# Patient Record
Sex: FEMALE | Race: WHITE | HISPANIC OR LATINO | URBAN - METROPOLITAN AREA
[De-identification: names, ages, dates, MRNs, and addresses within clinical notes are randomized per-mention and may not be internally consistent; named-entity substitution may affect disease eponyms.]

---

## 2021-10-10 ENCOUNTER — EMERGENCY (EMERGENCY)
Facility: HOSPITAL | Age: 46
LOS: 0 days | Discharge: HOME | End: 2021-10-10
Attending: EMERGENCY MEDICINE | Admitting: EMERGENCY MEDICINE
Payer: MEDICAID

## 2021-10-10 VITALS
RESPIRATION RATE: 16 BRPM | WEIGHT: 100.09 LBS | OXYGEN SATURATION: 98 % | HEART RATE: 100 BPM | SYSTOLIC BLOOD PRESSURE: 162 MMHG | DIASTOLIC BLOOD PRESSURE: 68 MMHG | TEMPERATURE: 97 F

## 2021-10-10 VITALS
OXYGEN SATURATION: 98 % | HEART RATE: 84 BPM | DIASTOLIC BLOOD PRESSURE: 68 MMHG | RESPIRATION RATE: 18 BRPM | SYSTOLIC BLOOD PRESSURE: 121 MMHG

## 2021-10-10 DIAGNOSIS — T84.021A DISLOCATION OF INTERNAL LEFT HIP PROSTHESIS, INITIAL ENCOUNTER: ICD-10-CM
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
## 2021-10-10 DIAGNOSIS — M25.552 PAIN IN LEFT HIP: ICD-10-CM

## 2021-10-10 DIAGNOSIS — Z88.1 ALLERGY STATUS TO OTHER ANTIBIOTIC AGENTS STATUS: ICD-10-CM

## 2021-10-10 LAB
ALBUMIN SERPL ELPH-MCNC: 3.8 G/DL — SIGNIFICANT CHANGE UP (ref 3.5–5.2)
ALP SERPL-CCNC: 124 U/L — HIGH (ref 30–115)
ALT FLD-CCNC: 38 U/L — SIGNIFICANT CHANGE UP (ref 0–41)
ANION GAP SERPL CALC-SCNC: 17 MMOL/L — HIGH (ref 7–14)
AST SERPL-CCNC: 53 U/L — HIGH (ref 0–41)
BASOPHILS # BLD AUTO: 0.07 K/UL — SIGNIFICANT CHANGE UP (ref 0–0.2)
BASOPHILS NFR BLD AUTO: 1.3 % — HIGH (ref 0–1)
BILIRUB SERPL-MCNC: 1.4 MG/DL — HIGH (ref 0.2–1.2)
BUN SERPL-MCNC: 10 MG/DL — SIGNIFICANT CHANGE UP (ref 10–20)
CALCIUM SERPL-MCNC: 9.1 MG/DL — SIGNIFICANT CHANGE UP (ref 8.5–10.1)
CHLORIDE SERPL-SCNC: 96 MMOL/L — LOW (ref 98–110)
CO2 SERPL-SCNC: 19 MMOL/L — SIGNIFICANT CHANGE UP (ref 17–32)
CREAT SERPL-MCNC: 0.5 MG/DL — LOW (ref 0.7–1.5)
EOSINOPHIL # BLD AUTO: 0.12 K/UL — SIGNIFICANT CHANGE UP (ref 0–0.7)
EOSINOPHIL NFR BLD AUTO: 2.2 % — SIGNIFICANT CHANGE UP (ref 0–8)
GLUCOSE SERPL-MCNC: 150 MG/DL — HIGH (ref 70–99)
HCG SERPL QL: NEGATIVE — SIGNIFICANT CHANGE UP
HCT VFR BLD CALC: 25.5 % — LOW (ref 37–47)
HGB BLD-MCNC: 8.8 G/DL — LOW (ref 12–16)
IMM GRANULOCYTES NFR BLD AUTO: 0.4 % — HIGH (ref 0.1–0.3)
LYMPHOCYTES # BLD AUTO: 1.25 K/UL — SIGNIFICANT CHANGE UP (ref 1.2–3.4)
LYMPHOCYTES # BLD AUTO: 22.6 % — SIGNIFICANT CHANGE UP (ref 20.5–51.1)
MAGNESIUM SERPL-MCNC: 1.7 MG/DL — LOW (ref 1.8–2.4)
MCHC RBC-ENTMCNC: 30 PG — SIGNIFICANT CHANGE UP (ref 27–31)
MCHC RBC-ENTMCNC: 34.5 G/DL — SIGNIFICANT CHANGE UP (ref 32–37)
MCV RBC AUTO: 87 FL — SIGNIFICANT CHANGE UP (ref 81–99)
MONOCYTES # BLD AUTO: 0.66 K/UL — HIGH (ref 0.1–0.6)
MONOCYTES NFR BLD AUTO: 11.9 % — HIGH (ref 1.7–9.3)
NEUTROPHILS # BLD AUTO: 3.41 K/UL — SIGNIFICANT CHANGE UP (ref 1.4–6.5)
NEUTROPHILS NFR BLD AUTO: 61.6 % — SIGNIFICANT CHANGE UP (ref 42.2–75.2)
NRBC # BLD: 0 /100 WBCS — SIGNIFICANT CHANGE UP (ref 0–0)
PLATELET # BLD AUTO: 622 K/UL — HIGH (ref 130–400)
POTASSIUM SERPL-MCNC: 3.8 MMOL/L — SIGNIFICANT CHANGE UP (ref 3.5–5)
POTASSIUM SERPL-SCNC: 3.8 MMOL/L — SIGNIFICANT CHANGE UP (ref 3.5–5)
PROT SERPL-MCNC: 9.4 G/DL — HIGH (ref 6–8)
RBC # BLD: 2.93 M/UL — LOW (ref 4.2–5.4)
RBC # FLD: 19 % — HIGH (ref 11.5–14.5)
SODIUM SERPL-SCNC: 132 MMOL/L — LOW (ref 135–146)
WBC # BLD: 5.53 K/UL — SIGNIFICANT CHANGE UP (ref 4.8–10.8)
WBC # FLD AUTO: 5.53 K/UL — SIGNIFICANT CHANGE UP (ref 4.8–10.8)

## 2021-10-10 PROCEDURE — 99284 EMERGENCY DEPT VISIT MOD MDM: CPT | Mod: 25

## 2021-10-10 PROCEDURE — 73502 X-RAY EXAM HIP UNI 2-3 VIEWS: CPT | Mod: 26,LT

## 2021-10-10 PROCEDURE — 27266 TREAT HIP DISLOCATION: CPT

## 2021-10-10 RX ORDER — PROPOFOL 10 MG/ML
25 INJECTION, EMULSION INTRAVENOUS ONCE
Refills: 0 | Status: COMPLETED | OUTPATIENT
Start: 2021-10-10 | End: 2021-10-10

## 2021-10-10 RX ORDER — FENTANYL CITRATE 50 UG/ML
50 INJECTION INTRAVENOUS ONCE
Refills: 0 | Status: DISCONTINUED | OUTPATIENT
Start: 2021-10-10 | End: 2021-10-10

## 2021-10-10 RX ORDER — MORPHINE SULFATE 50 MG/1
6 CAPSULE, EXTENDED RELEASE ORAL ONCE
Refills: 0 | Status: DISCONTINUED | OUTPATIENT
Start: 2021-10-10 | End: 2021-10-10

## 2021-10-10 RX ORDER — MORPHINE SULFATE 50 MG/1
4 CAPSULE, EXTENDED RELEASE ORAL ONCE
Refills: 0 | Status: DISCONTINUED | OUTPATIENT
Start: 2021-10-10 | End: 2021-10-10

## 2021-10-10 RX ORDER — SODIUM CHLORIDE 9 MG/ML
1000 INJECTION INTRAMUSCULAR; INTRAVENOUS; SUBCUTANEOUS ONCE
Refills: 0 | Status: COMPLETED | OUTPATIENT
Start: 2021-10-10 | End: 2021-10-10

## 2021-10-10 RX ORDER — MAGNESIUM OXIDE 400 MG ORAL TABLET 241.3 MG
400 TABLET ORAL ONCE
Refills: 0 | Status: COMPLETED | OUTPATIENT
Start: 2021-10-10 | End: 2021-10-10

## 2021-10-10 RX ORDER — KETAMINE HYDROCHLORIDE 100 MG/ML
25 INJECTION INTRAMUSCULAR; INTRAVENOUS ONCE
Refills: 0 | Status: DISCONTINUED | OUTPATIENT
Start: 2021-10-10 | End: 2021-10-10

## 2021-10-10 RX ADMIN — MORPHINE SULFATE 6 MILLIGRAM(S): 50 CAPSULE, EXTENDED RELEASE ORAL at 17:26

## 2021-10-10 RX ADMIN — PROPOFOL 25 MILLIGRAM(S): 10 INJECTION, EMULSION INTRAVENOUS at 18:20

## 2021-10-10 RX ADMIN — KETAMINE HYDROCHLORIDE 25 MILLIGRAM(S): 100 INJECTION INTRAMUSCULAR; INTRAVENOUS at 18:31

## 2021-10-10 RX ADMIN — FENTANYL CITRATE 50 MICROGRAM(S): 50 INJECTION INTRAVENOUS at 18:26

## 2021-10-10 RX ADMIN — MORPHINE SULFATE 4 MILLIGRAM(S): 50 CAPSULE, EXTENDED RELEASE ORAL at 19:30

## 2021-10-10 RX ADMIN — PROPOFOL 25 MILLIGRAM(S): 10 INJECTION, EMULSION INTRAVENOUS at 18:27

## 2021-10-10 RX ADMIN — SODIUM CHLORIDE 1000 MILLILITER(S): 9 INJECTION INTRAMUSCULAR; INTRAVENOUS; SUBCUTANEOUS at 18:20

## 2021-10-10 RX ADMIN — MAGNESIUM OXIDE 400 MG ORAL TABLET 400 MILLIGRAM(S): 241.3 TABLET ORAL at 20:22

## 2021-10-10 RX ADMIN — PROPOFOL 25 MILLIGRAM(S): 10 INJECTION, EMULSION INTRAVENOUS at 18:25

## 2021-10-10 RX ADMIN — FENTANYL CITRATE 50 MICROGRAM(S): 50 INJECTION INTRAVENOUS at 18:30

## 2021-10-10 NOTE — ED PROVIDER NOTE - PHYSICAL EXAMINATION
VITAL SIGNS: I have reviewed nursing notes and confirm.  CONSTITUTIONAL: Well-developed; well-nourished; in mild distress.  SKIN: Skin exam is warm and dry, no acute rash.  HEAD: Normocephalic; atraumatic.  EYES: PERRL, EOM intact; conjunctiva and sclera clear.  ENT: No nasal discharge; airway clear.  NECK: Supple; non tender.  CARD: S1, S2 normal; no murmurs, gallops, or rubs. Regular rate and rhythm.  RESP: No wheezes, rales or rhonchi. Speaking in full sentences.   ABD: Normal bowel sounds; soft; non-distended; non-tender; No rebound or guarding. No CVA tenderness.  EXT: (+) left hip flexed, unable to extend 2/2 pain. (+) TTP to left hip/proximal femur. DP 2+ B/L and equal.   NEURO: Alert, oriented. Grossly unremarkable. No focal deficits.

## 2021-10-10 NOTE — ED ADULT NURSE NOTE - OBJECTIVE STATEMENT
pt 44 y/o female BIBA c/o left hip pain pt s/p left hip replacement 9/29 in [facility] as per pt she has been doing well and was fine this am she took a nap and woke up in immense pain, pt surgical site clean dry and intact no redness noted

## 2021-10-10 NOTE — CONSULT NOTE ADULT - ASSESSMENT
Pt Name: JILL SLATER  MRN: [id number]      HPI: 45yFemale presents with pain in left hip. Patient underwent left MICHAEL at [facility] 10 days ago. Pain started today while she was sleeping.  Patient denies head trauma or LOC. Denies pain elsewhere. Denies paresthesias.      PAST MEDICAL & SURGICAL HISTORY:  Sickle cell anemia        Allergies: erythromycin (Unknown)      Medications:       PHYSICAL EXAM:    Vital Signs Last 24 Hrs  T(C): 36.1 (10 Oct 2021 17:08), Max: 36.1 (10 Oct 2021 17:08)  T(F): 97 (10 Oct 2021 17:08), Max: 97 (10 Oct 2021 17:08)  HR: 90 (10 Oct 2021 19:01) (75 - 110)  BP: 130/70 (10 Oct 2021 19:01) (124/72 - 162/68)  BP(mean): 98 (10 Oct 2021 19:01) (75 - 110)  RR: 18 (10 Oct 2021 19:01) (16 - 18)  SpO2: 97% (10 Oct 2021 19:01) (82% - 100%)    Physical Exam:  General: NAD, Alert, Awake and oriented    LLE:   No open skin or wounds  posterior approach incision c/d/i  leg 2 cm short  SILT DP/SP/T/Garcia/Sa.   EHL/FHL/TA/Gs motor intact.  2+ DP/PT pulses with brisk cap refill distally.  Compartments soft and compressible.   No pain on passive stretch.    Labs:                        8.8    5.53  )-----------( 622      ( 10 Oct 2021 18:17 )             25.5     10-10    132<L>  |  96<L>  |  10  ----------------------------<  150<H>  3.8   |  19  |  0.5<L>    Ca    9.1      10 Oct 2021 18:17  Mg     1.7     10-10    TPro  9.4<H>  /  Alb  3.8  /  TBili  1.4<H>  /  DBili  x   /  AST  53<H>  /  ALT  38  /  AlkPhos  124<H>  10-10    A/P:    45y Female with left hip posterior dislocation s/p MICHAEL 10 days ago    - Procedure: Closed reduced in under conscious sedation  -WBAT LLE in KI, posterior hip precautions explained  -Patient instructed to return to ED if any worsening pain, numbness, tingling, fevers, chills or any other concerning symptoms  - F/U with primary surgeon in 1 week, otherwise Dr. Huffman in 1 week. Please call [phone]

## 2021-10-10 NOTE — ED PROVIDER NOTE - PATIENT PORTAL LINK FT
You can access the FollowMyHealth Patient Portal offered by [facility] by registering at the following website: [email or web address]. By joining [employer]’s FollowMyHealth portal, you will also be able to view your health information using other applications (apps) compatible with our system.

## 2021-10-10 NOTE — ED PROVIDER NOTE - CLINICAL SUMMARY MEDICAL DECISION MAKING FREE TEXT BOX
Patient presented with hip dislocation, hx prosthetic hip. Otherwise afebrile, HD stable, neurovascularly intact. Xray confirmed dislocation without evidence of fx. Consulted ortho who evaluated patient in ED. In conjunction with ortho, successfully reduced hip under conscious sedation after which time pain improved. Per ortho - patient can be discharged and follow up as outpatient. Patient and  at bedside agreeable with plan. Agrees to return to ED for any new or worsening symptoms.

## 2021-10-10 NOTE — ED PROVIDER NOTE - PROGRESS NOTE DETAILS
Pt feeling well, denies any complaints. Pain improved. Will d/c to follow-up with her orthopedist ASAP. Return precautions provided. Pt agreeable to d.c.

## 2021-10-10 NOTE — ED PROVIDER NOTE - NS ED ROS FT
Review of Systems  Constitutional:  No fever, chills.  ENMT:  No hearing changes, pain, or discharge. No nasal congestion, discharge, or bleeding. No throat pain, swelling, or difficulty swallowing.  Cardiac:  No chest pain, palpitations, syncope, or edema.  Respiratory:  No dyspnea, cough. No hemoptysis.  GI:  No nausea, vomiting, diarrhea, or abdominal pain.   :  No dysuria, hematuria, frequency, or burning.   MS:  No back pain. (+) left hip pain  Skin:  No skin rash, pruritis, jaundice, or lesions.  Neuro:  No headache, dizziness, loss of sensation, or focal weakness.  No change in mental status.

## 2021-10-10 NOTE — ED PROVIDER NOTE - OBJECTIVE STATEMENT
44 yo F with PMHx of sickle cell disease and recent left hip replacement last Wednesday @ [facility] presents to the ED c/o severe left hip pain. Pt states she went to take a nap and woke up with pain. Pt has been unable to move her leg since then 2/2 pain. She denies trauma to area. She denies other complaints. Pt denies fever, chills, nausea, vomiting, abdominal pain, diarrhea, headache, dizziness, weakness, chest pain, SOB, back pain, LOC, trauma, urinary symptoms, cough, calf pain/swelling, recent travel.

## 2021-10-10 NOTE — PROCEDURAL SAFETY CHECKLIST WITH OR WITHOUT SEDATION - NSPOSTCOMMENTFT_GEN_ALL_CORE
Patient tolerated procedure well, patient recovered from sedation, VS and tidal C02 monitored, patient reports decrease in pain.

## 2021-10-10 NOTE — ED PROVIDER NOTE - NSFOLLOWUPINSTRUCTIONS_ED_ALL_ED_FT
Prosthetic Hip Dislocation    A prosthetic hip dislocation means that your artificial hip joint (prosthesis) has moved out of place. This can happen because of a hard, direct hit or injury (trauma) to the hip, such as a fall. A prosthetic hip joint may be more likely to dislocate than a normal hip joint because a prosthetic hip joint does not have all the structures that normally hold the joint in place.    After the prosthetic hip is moved back into place, you will generally recover fully. Prosthetic hip dislocation is a medical emergency that requires immediate treatment.    What are the causes?  This condition is often caused by trauma to the hip. Trauma may be caused by:  Falls, especially falls from a height.  Motor vehicle accidents.  Contact sports.  Industrial accidents.  What increases the risk?  This condition is more likely to develop in:  People over 60 years of age.  People with bone loss (osteoporosis).  People who lack vitamin D.  People who are overweight or obese.  People who lack hip strength and flexibility.  People with poor vision.  What are the signs or symptoms?  Symptoms of this condition include:  Severe pain in the hip area. Pain may get worse when you move or try to put weight on the hip.  Inability to move the hip.  The leg on the side of the dislocated hip appearing shorter than the other leg.  The foot on the side of the dislocated hip turning inward.  Loss of feeling in your lower leg, foot, or ankle.  How is this diagnosed?  This condition is diagnosed based on a physical exam and your medical history. You may be examined by a health care provider who specializes in bone disorders (orthopedist). You may have tests, including:  X-rays to check for breaks (fractures) in your thigh bone (femur) or hip bone (pelvis).  CT scan to check for damage to other tissues near the joint.  Blood tests.  How is this treated?  This condition is treated by moving your prosthetic hip joint and femur back into place (closed reduction). A closed reduction is not a surgery. It is done without cutting your skin open. During a closed reduction, a health care provider uses pressure and rotation to put bones back into place. You will be given medicines to help relieve pain.    After a closed reduction, you may have X-rays to make sure that:  The prosthetic hip joint is securely in the hip socket.  There are no breaks or pieces of bone in the joint.  If your prosthetic hip dislocation was severe, you may be given a splint or brace to wear. You may also be given crutches to use until you feel better. Surgery is sometimes needed if dislocation occurs more than once.    Follow these instructions at home:  Medicines     Take over-the-counter and prescription medicines only as told by your health care provider.  Do not drive or operate heavy machinery while taking prescription pain medicine.  Activity     Return to your normal activities as told by your health care provider. Ask your health care provider what activities are safe for you.  Avoid intense physical activity until you feel better. Stop doing an activity if it causes pain or discomfort.  Do not lie on your hip until your health care provider says that you can do this.  Perform range-of-motion exercises only as told by your health care provider.  General instructions     If directed, apply ice to the injured area:  Put ice in a plastic bag.  Place a towel between your skin and the bag.  Leave the ice on for 20 minutes, 2–3 times a day.  Do not use any tobacco products, such as cigarettes, chewing tobacco, and e-cigarettes. Tobacco can delay bone healing. If you need help quitting, ask your health care provider.  Use crutches as told by your health care provider.  Keep all follow-up visits as told by your health care provider. This is important.  If you have a splint or brace:     Wear the splint or brace as told by your health care provider. Remove it only as told by your health care provider.  Loosen the splint or brace if your toes tingle, become numb, or turn cold and blue.  Keep the splint or brace clean and dry.  How is this prevented?  Avoid crossing your legs. Try to keep your knees apart when getting in and out of your car.  Do not lean forward beyond 90 degrees or raise your knee higher than the level of your hip.  Do not sit in low chairs. Add extra cushions to increase the height of your chairs or couch.  Do not pivot. Pivoting is when you turn your hips without moving your feet off the ground. Take short steps when you turn.  Contact a health care provider if:  It is not getting easier to walk or move.  You have swelling or tenderness in your leg.  You have numbness or tingling in any part of your hip or leg.  You have pain or swelling that gets worse or does not get better with medicine.  Get help right away if:  Your hip dislocates again.  You have severe pain.  Your leg or foot turns blue or feels unusually cold.  You have difficulty breathing.  If you have symptoms of a hip dislocation, do not wait to see if the symptoms will go away. Get medical help right away. Call your local emergency services (911 in the [place] Do not drive yourself to the hospital.     This information is not intended to replace advice given to you by your health care provider. Make sure you discuss any questions you have with your health care provider.

## 2021-10-10 NOTE — ED ADULT NURSE NOTE - CHIEF COMPLAINT QUOTE
Pt s/p left hip replacement last Wednesday at [facility]; woke up this morning w/ severe left hip pain. Pt is prescribed oxycodone but did not take it today. Pt has hx of sickle cell disease.

## 2021-10-10 NOTE — ED ADULT TRIAGE NOTE - CHIEF COMPLAINT QUOTE
Pt s/p left hip replacement last Wednesday at [facility]; woke up this morning w/ severe left hip pain. Pt is prescribed oxycodone but did not take it today. Pt has hx of sickle cell disease.

## 2021-10-10 NOTE — ED PROVIDER NOTE - ATTENDING CONTRIBUTION TO CARE
pt here with left hip pain. pt had hip replacement on sept 29th in [facility]. Was doing fine, woke up from sleep with severe left hip pain. No trauma, fever. on exam pain with mvt of left hip, surgical site clean    xray shows hip dislocation, moved to crit.   Dr. Londono aware.

## 2021-10-10 NOTE — ED PROVIDER NOTE - CARE PROVIDER_API CALL
AGUSTINA ERVIN  Specialist  [address] [address]  Phone: [phone]  Fax: [phone]  Follow Up Time: 1-3 Days

## 2021-10-11 NOTE — ED PROCEDURE NOTE - NS_POSTPROCCAREGUIDE_ED_ALL_ED
Patient is now fully awake, with vital signs and temperature stable, hydration is adequate, patients Zenobia’s  score is at baseline (or greater than 8), patient and escort has received  discharge education.

## 2023-03-15 NOTE — ED ADULT NURSE NOTE - NSIMPLEMENTINTERV_GEN_ALL_ED
Implemented All Fall Risk Interventions:  [place] to call system. Call bell, personal items and telephone within reach. Instruct patient to call for assistance. Room bathroom lighting operational. Non-slip footwear when patient is off stretcher. Physically safe environment: no spills, clutter or unnecessary equipment. Stretcher in lowest position, wheels locked, appropriate side rails in place. Provide visual cue, wrist band, yellow gown, etc. Monitor gait and stability. Monitor for mental status changes and reorient to person, place, and time. Review medications for side effects contributing to fall risk. Reinforce activity limits and safety measures with patient and family.
There are no Wet Read(s) to document.